# Patient Record
(demographics unavailable — no encounter records)

---

## 2024-12-18 NOTE — HISTORY OF PRESENT ILLNESS
[FreeTextEntry1] : The patient began to have numbness and tingling in left forearm and leg around May 2024. MRI lumbar spine showed an enhancing intradural lesion towards the right at the L3 level suggestive of Schwannoma  He returns today for a new MRI lumbar spine. MRI done on 12/16/24 with relatively unchanged schwannoma. He reports remains with numbness in left arm and leg

## 2024-12-18 NOTE — ASSESSMENT
[FreeTextEntry1] : 63 years old man with numbness and tingling in left forearm and leg. MRI lumbar spine showed an enhancing intradural lesion towards the right at the L3 level, suggestive of schwannoma. MRI done on 12/16/24 with relatively unchanged schwannoma. He reports remains with numbness in left arm and leg  Plan: - MRI lumbar spine with and without contrast in August 2025 - Follow up after MRI

## 2024-12-18 NOTE — PHYSICAL EXAM
[General Appearance - Alert] : alert [General Appearance - In No Acute Distress] : in no acute distress [General Appearance - Well Developed] : well developed [General Appearance - Well Nourished] : well nourished [Impaired Insight] : insight and judgment were intact [Oriented To Time, Place, And Person] : oriented to person, place, and time [Affect] : the affect was normal [Mood] : the mood was normal [Motor Strength] : muscle strength was normal in all four extremities [Balance] : balance was intact [Neck Appearance] : the appearance of the neck was normal [Neck Cervical Mass (___cm)] : no neck mass was observed [Exaggerated Use Of Accessory Muscles For Inspiration] : no accessory muscle use [] : no respiratory distress [No Spinal Tenderness] : no spinal tenderness [Abnormal Walk] : normal gait [Nail Clubbing] : no clubbing  or cyanosis of the fingernails [Musculoskeletal - Swelling] : no joint swelling seen [Motor Tone] : muscle strength and tone were normal